# Patient Record
Sex: MALE | Employment: FULL TIME | URBAN - METROPOLITAN AREA
[De-identification: names, ages, dates, MRNs, and addresses within clinical notes are randomized per-mention and may not be internally consistent; named-entity substitution may affect disease eponyms.]

---

## 2020-08-17 ENCOUNTER — HOSPITAL ENCOUNTER (EMERGENCY)
Facility: CLINIC | Age: 35
Discharge: HOME | End: 2020-08-17
Attending: NURSE PRACTITIONER | Admitting: NURSE PRACTITIONER
Payer: OTHER MISCELLANEOUS

## 2020-08-17 VITALS
DIASTOLIC BLOOD PRESSURE: 89 MMHG | TEMPERATURE: 98.2 F | RESPIRATION RATE: 16 BRPM | SYSTOLIC BLOOD PRESSURE: 129 MMHG | HEART RATE: 87 BPM | OXYGEN SATURATION: 98 %

## 2020-08-17 DIAGNOSIS — S81.811A LACERATION OF RIGHT LOWER EXTREMITY, INITIAL ENCOUNTER: ICD-10-CM

## 2020-08-17 PROCEDURE — 12001 RPR S/N/AX/GEN/TRNK 2.5CM/<: CPT

## 2020-08-17 PROCEDURE — 99283 EMERGENCY DEPT VISIT LOW MDM: CPT | Mod: 25

## 2020-08-17 RX ORDER — LIDOCAINE HYDROCHLORIDE AND EPINEPHRINE 10; 10 MG/ML; UG/ML
INJECTION, SOLUTION INFILTRATION; PERINEURAL
Status: DISCONTINUED
Start: 2020-08-17 | End: 2020-08-17 | Stop reason: HOSPADM

## 2020-08-17 ASSESSMENT — ENCOUNTER SYMPTOMS
NUMBNESS: 0
WEAKNESS: 0
WOUND: 1

## 2020-08-17 NOTE — ED TRIAGE NOTES
Presents to the ED with a laceration to the right upper leg. Cut with a hand saw at work. Patient's lower pant leg saturated in blood. Estimates it happened 45 minutes pta.

## 2020-08-17 NOTE — ED AVS SNAPSHOT
[facility] Emergency Department  [address]  [facility] [address]  Phone:  [phone]  Fax:  [phone]                                    Surinder Amos   MRN: [id number]    Department:  [facility] Emergency Department   Date of Visit:  8/17/2020           After Visit Summary Signature Page    I have received my discharge instructions, and my questions have been answered. I have discussed any challenges I see with this plan with the nurse or doctor.    ..........................................................................................................................................  Patient/Patient Representative Signature      ..........................................................................................................................................  Patient Representative Print Name and Relationship to Patient    ..................................................               ................................................  Date                                   Time    ..........................................................................................................................................  Reviewed by Signature/Title    ...................................................              ..............................................  Date                                               Time          22EPIC Rev 08/18

## 2020-08-17 NOTE — ED PROVIDER NOTES
History   Chief Complaint  Laceration    The history is provided by the patient. The history is limited by a language barrier. A  was used (Lebanese Ipad  used).      Surinder Amos is a 34 year old male, last Tdap in 2018 per [facility], who presents after stabbing himself in his right thigh with a non-electric handsaw while trying to cut down trees at 1030 AM this morning. He had significant bleeding and covered his leg in a tight bandana dressing. He believes the cut is a half cm deep.     Allergies  No Known Drug Allergies    Medications  The patient is not currently taking any prescribed medications.    Past Medical History  The patient does not have any pertinent past medical history.    Past Surgical History   History reviewed.  No pertinent past surgical history.    Family History  History reviewed. No pertinent family history.    Social History  The patient was unaccompanied to the ED.    Review of Systems   Skin: Positive for wound.   Neurological: Negative for weakness and numbness.   All other systems reviewed and are negative.      Physical Exam     Patient Vitals for the past 24 hrs:   BP Temp Pulse Resp SpO2   08/17/20 1123 129/89 98.2  F (36.8  C) 87 16 98 %       Physical Exam  General: Alert, No obvious discomfort, well kept   HENT:  Normal voice, No lymphadenopathy  Eyes:  The pupils are equal, round, and reactive to light, Conjunctiva normal, No scleral icterus   Neck:  Normal range of motion  CV:  Normal Pulses  Resp:  Non-labored, No cough  MS:  Normal muscular tone, moves all extremities  Skin:   1 cm in length and 1.25 cm deep laceration over right thigh. No foreign body noted.  Neuro: Speech is normal and fluent  Psych: Awake. Alert.  Normal affect.  Appropriate interactions. Good eye contact    Emergency Department Course     Procedures    Laceration Repair        LACERATION:  A simple clean 1 cm in length and 1.25 cm deep laceration.      LOCATION:  Right  thigh      FUNCTION:  Distally sensation, circulation, motor and tendon function are intact.      ANESTHESIA:  Lidocaine 1% with epinephrine      PREPARATION:  Irrigation with Techni-Care and Shur Clens      DEBRIDEMENT:  no debridement, no foreign bodies      CLOSURE:  Wound was closed with One Layer.  Skin closed with 2 x Nylon using horizontal mattress sutures.    Emergency Department Course:  Past medical records, nursing notes, and vitals reviewed.    1124 I physically examined the patient as documented above.    1155 I performed a laceration repair, as documented above.     Findings and plan explained to the Patient. Patient discharged home with instructions regarding supportive care, medications, and reasons to return. The importance of close follow-up was reviewed.     I personally answered all related questions prior to discharge.     Impression & Plan   Medical Decision Making:  The patient presented with a laceration.  The wound was carefully evaluated and explored.  The laceration was closed with sutures/staples as noted above.  There is no evidence of muscular, tendon, or bony damage with this laceration.  No signs of foreign body.  Possible complications (infection, scarring) were reviewed with the patient. ABX not indicated. Follow up with primary care will be indicated for suture/staple removal as noted in the discharge section.    Diagnosis:    ICD-10-CM    1. Laceration of right lower extremity, initial encounter  S81.811A      Disposition:  Discharged to home.    Scribe Disclosure:  DARIA, Deepti Copeland, am serving as a scribe at 11:16 AM on 8/17/2020 to document services personally performed by No att. providers found based on my observations and the provider's statements to me.      Brian Agosto, JESU CNP  08/17/20 [id number]

## 2020-08-31 ENCOUNTER — NURSE TRIAGE (OUTPATIENT)
Dept: NURSING | Facility: CLINIC | Age: 35
End: 2020-08-31

## 2020-08-31 ENCOUNTER — APPOINTMENT (OUTPATIENT)
Dept: INTERPRETER SERVICES | Facility: CLINIC | Age: 35
End: 2020-08-31

## 2020-08-31 NOTE — TELEPHONE ENCOUNTER
Triage completed with assistance from Slovenian speaking .    Patient reports sutured wound (see ED note 8/17) on right leg above knee painful and small amount of redness developing around wound. Increased pain began yesterday, was unable to put weight on leg due to severity of pain. This morning rates pain 5/10, took tylenol and now pain 2/10. Area feels warm to touch.    Denies drainage or bleeding from site. Denies feeling febrile but unable to take temperature at home. Denies swelling.     Advised PCP or UC visit today or tomorrow. Transferred to Central Scheduling.    Tiffani Joya RN  [facility]    COVID 19 Nurse Triage Plan/Patient Instructions    Please be aware that novel coronavirus (COVID-19) may be circulating in the community. If you develop symptoms such as fever, cough, or SOB or if you have concerns about the presence of another infection including coronavirus (COVID-19), please contact your health care provider or visit [email or web address].     Disposition/Instructions    In-Person Visit with provider recommended. Reference Visit Selection Guide.    Thank you for taking steps to prevent the spread of this virus.  o Limit your contact with others.  o Wear a simple mask to cover your cough.  o Wash your hands well and often.    Resources    [facility]: About COVID-19: [email or web address]    CDC: What to Do If You're Sick: www.cdc.gov/coronavirus/2019-ncov/about/steps-when-sick.html    CDC: Ending Home Isolation: www.cdc.gov/coronavirus/2019-ncov/hcp/disposition-in-home-patients.html     CDC: Caring for Someone: www.cdc.gov/coronavirus/2019-ncov/if-you-are-sick/care-for-someone.html     [facility]: Interim Guidance for Hospital Discharge to Home: www.health.[facility].[place]/diseases/coronavirus/hcp/hospdischarge.pdf    [facility] clinical trials (COVID-19 research studies): clinicalaffairs.[facility]/umn-clinical-trials     Below are the COVID-19 hotlines at the  [place] Department of Health ([facility]). Interpreters are available.   o For health questions: Call 066-192-0663 or 1-981.326.5940 (7 a.m. to 7 p.m.)  o For questions about schools and childcare: Call 922-663-6493 or 1-846.729.5793 (7 a.m. to 7 p.m.)                       Additional Information    Negative: Bright red, wide-spread, sunburn-like rash    Negative: Black (necrotic) or blisters develop in wound    Negative: Looks infected (spreading redness, red streak, pus) and fever    Negative: Patient sounds very sick or weak to the triager    Negative: Red streak runs from the wound    Negative: Facial wound looks infected (spreading redness)    Negative: Severe pain in the wound    Negative: Finger wound and entire finger swollen    Negative: Skin redness around the wound larger than 2 inches (5 cm)    Negative: Pus or cloudy fluid draining from wound    Negative: Taking antibiotic > 48 hours and fever persists    Negative: Taking antibiotic > 72 hours (3 days) and infected wound not improved (pain, pus, redness)    Negative: No prior tetanus shots (or is not fully vaccinated) and any wound (e.g., cut or scrape)    Negative: Patient wants to be seen    Negative: Pimple where a stitch comes through the skin    Wound becomes more painful or swollen, 3 or more days after injury    Protocols used: WOUND INFECTION-A-OH